# Patient Record
Sex: FEMALE | URBAN - METROPOLITAN AREA
[De-identification: names, ages, dates, MRNs, and addresses within clinical notes are randomized per-mention and may not be internally consistent; named-entity substitution may affect disease eponyms.]

---

## 2022-08-18 ENCOUNTER — APPOINTMENT (OUTPATIENT)
Dept: URBAN - METROPOLITAN AREA CLINIC 247 | Age: 52
Setting detail: DERMATOLOGY
End: 2022-08-18

## 2022-08-18 DIAGNOSIS — L98.8 OTHER SPECIFIED DISORDERS OF THE SKIN AND SUBCUTANEOUS TISSUE: ICD-10-CM

## 2022-08-18 PROCEDURE — OTHER MIPS QUALITY: OTHER

## 2022-08-18 PROCEDURE — OTHER BOTOX: OTHER

## 2022-08-18 NOTE — PROCEDURE: BOTOX
Right Periorbital Units: 0
Show Glabellar Units: Yes
Show Mentalis Units: No
Post-Care Instructions: Patient instructed to not lie down for 4 hours and limit physical activity for 24 hours.
Dilution (U/0.1 Cc): 2.5
Consent: Written consent obtained. Risks include but not limited to lid/brow ptosis, bruising, swelling, diplopia, temporary effect, incomplete chemical denervation.
Comments: Denies breast feeding or pregnancy. Very rare allergic reactions.\\n\\nPatient instructed to exercise muscles injected today for next 1-2 hours.\\nNo face down massage or exercising today. Stay upright for the next 4+hours.
Detail Level: Detailed

## 2022-10-31 ENCOUNTER — APPOINTMENT (OUTPATIENT)
Dept: URBAN - METROPOLITAN AREA CLINIC 247 | Age: 52
Setting detail: DERMATOLOGY
End: 2022-11-02

## 2022-10-31 DIAGNOSIS — Z41.9 ENCOUNTER FOR PROCEDURE FOR PURPOSES OTHER THAN REMEDYING HEALTH STATE, UNSPECIFIED: ICD-10-CM

## 2022-10-31 PROCEDURE — OTHER BOTOX: OTHER

## 2022-10-31 ASSESSMENT — LOCATION ZONE DERM: LOCATION ZONE: LIP

## 2022-10-31 ASSESSMENT — LOCATION DETAILED DESCRIPTION DERM: LOCATION DETAILED: RIGHT UPPER CUTANEOUS LIP

## 2022-10-31 ASSESSMENT — LOCATION SIMPLE DESCRIPTION DERM: LOCATION SIMPLE: RIGHT LIP

## 2022-10-31 NOTE — PROCEDURE: BOTOX
Show Additional Area 6: Yes
Inferior Lateral Orbicularis Oculi Units: 0
Show Ucl Units: No
Dilution (U/0.1 Cc): 2.5
Detail Level: Detailed
Post-Care Instructions: Patient instructed to not lie down for 4 hours and limit physical activity for 24 hours.
Additional Area 1 Location: upper lip margin
Consent: Written consent obtained. Risks include but not limited to lid/brow ptosis, bruising, swelling, diplopia, temporary effect, incomplete chemical denervation.
Additional Area 1 Units: 4

## 2022-12-06 ENCOUNTER — APPOINTMENT (OUTPATIENT)
Dept: URBAN - METROPOLITAN AREA CLINIC 247 | Age: 52
Setting detail: DERMATOLOGY
End: 2022-12-06

## 2022-12-06 DIAGNOSIS — Z41.9 ENCOUNTER FOR PROCEDURE FOR PURPOSES OTHER THAN REMEDYING HEALTH STATE, UNSPECIFIED: ICD-10-CM

## 2022-12-06 PROCEDURE — OTHER BOTOX: OTHER

## 2022-12-06 PROCEDURE — OTHER BOTOX (U OR CC): OTHER

## 2022-12-06 NOTE — PROCEDURE: BOTOX (U OR CC)
Document As Units Or Cc?: units
Additional Area 4 Units: 0
Post-Care Instructions: Patient instructed to not lie down for 4 hours and limit physical activity for 24 hours.
Dilution (U/0.1 Cc): 2.5
Including Pricing Information In The Note: No
Detail Level: Detailed
Consent: Written consent obtained. Risks include but not limited to lid/brow ptosis, bruising, swelling, diplopia, temporary effect, incomplete chemical denervation.

## 2022-12-06 NOTE — PROCEDURE: BOTOX
Comments: Written consent obtained. Risks include but not limited to lid/brow ptosis, bruising, swelling, diplopia, temporary effect, incomplete chemical denervation an asymmetry. Denies breast feeding or pregnancy. Very rare allergic reactions.\\n$12/unit\\nPatient instructed to exercise muscles injected today for next 1-2 hours.\\nNo face down massage or exercising today. Stay upright for the next 4+hours.

## 2023-03-20 ENCOUNTER — APPOINTMENT (OUTPATIENT)
Dept: URBAN - METROPOLITAN AREA CLINIC 247 | Age: 53
Setting detail: DERMATOLOGY
End: 2023-03-23

## 2023-03-20 DIAGNOSIS — Z41.9 ENCOUNTER FOR PROCEDURE FOR PURPOSES OTHER THAN REMEDYING HEALTH STATE, UNSPECIFIED: ICD-10-CM

## 2023-03-20 PROCEDURE — OTHER BOTOX: OTHER

## 2023-05-11 ENCOUNTER — APPOINTMENT (OUTPATIENT)
Dept: URBAN - METROPOLITAN AREA CLINIC 247 | Age: 53
Setting detail: DERMATOLOGY
End: 2023-05-11

## 2023-05-11 DIAGNOSIS — L98.8 OTHER SPECIFIED DISORDERS OF THE SKIN AND SUBCUTANEOUS TISSUE: ICD-10-CM

## 2023-05-11 PROCEDURE — OTHER MIPS QUALITY: OTHER

## 2023-05-11 PROCEDURE — OTHER BOTOX: OTHER

## 2023-05-11 NOTE — PROCEDURE: BOTOX
Show Lateral Platysmal Band Units: Yes
Nasal Root Units: 0
Show Right And Left Periorbital Units: No
Comments: Denies breast feeding or pregnancy. Very rare allergic reactions.\\nPatient instructed to exercise muscles injected today for next 1-2 hours.\\nNo face down massage or exercising today. Stay upright for the next 4+hours.
Forehead Units: 10
Consent: Written consent obtained. Risks include but not limited to lid/brow ptosis, bruising, swelling, diplopia, temporary effect, incomplete chemical denervation.
Glabellar Complex Units: 6
Periorbital Skin Units: 24
Detail Level: Detailed
Post-Care Instructions: Patient instructed to not lie down for 4 hours and limit physical activity for 24 hours.
Dilution (U/0.1 Cc): 2.5

## 2023-06-16 ENCOUNTER — APPOINTMENT (OUTPATIENT)
Dept: URBAN - METROPOLITAN AREA CLINIC 245 | Age: 53
Setting detail: DERMATOLOGY
End: 2023-06-17

## 2023-06-16 DIAGNOSIS — Z41.9 ENCOUNTER FOR PROCEDURE FOR PURPOSES OTHER THAN REMEDYING HEALTH STATE, UNSPECIFIED: ICD-10-CM

## 2023-06-16 PROCEDURE — OTHER BOTOX: OTHER

## 2023-10-24 ENCOUNTER — APPOINTMENT (OUTPATIENT)
Dept: URBAN - METROPOLITAN AREA CLINIC 247 | Age: 53
Setting detail: DERMATOLOGY
End: 2023-10-24

## 2023-10-24 DIAGNOSIS — Z23 ENCOUNTER FOR IMMUNIZATION: ICD-10-CM

## 2023-10-24 PROCEDURE — 90674 CCIIV4 VAC NO PRSV 0.5 ML IM: CPT

## 2023-10-24 PROCEDURE — 90471 IMMUNIZATION ADMIN: CPT

## 2023-10-24 PROCEDURE — OTHER IMMUNIZATION: OTHER

## 2023-10-24 ASSESSMENT — LOCATION DETAILED DESCRIPTION DERM: LOCATION DETAILED: LEFT ANTERIOR PROXIMAL UPPER ARM

## 2023-10-24 ASSESSMENT — LOCATION SIMPLE DESCRIPTION DERM: LOCATION SIMPLE: LEFT UPPER ARM

## 2023-10-24 ASSESSMENT — LOCATION ZONE DERM: LOCATION ZONE: ARM

## 2023-10-24 NOTE — PROCEDURE: IMMUNIZATION
Medication: Influenza vaccination (CPT 39958) - Quadrivalent (ccIIV4), derived from cell cultures, subunit, preservative and antibiotic free, for intramuscular use Medication: Influenza vaccination (CPT 24577) - Quadrivalent (ccIIV4), derived from cell cultures, subunit, preservative and antibiotic free, for intramuscular use

## 2023-10-24 NOTE — PROCEDURE: IMMUNIZATION
Influenza Vaccination (Quadrivalent (Cciiv4), Derived From Cell Cultures, Subunit, Preservative And Antibiotic Free, For Intramuscular Use) Ndc: 10688726041 Influenza Vaccination (Quadrivalent (Cciiv4), Derived From Cell Cultures, Subunit, Preservative And Antibiotic Free, For Intramuscular Use) Ndc: 70429230331

## 2023-10-31 ENCOUNTER — APPOINTMENT (OUTPATIENT)
Dept: URBAN - METROPOLITAN AREA CLINIC 247 | Age: 53
Setting detail: DERMATOLOGY
End: 2023-11-01

## 2023-10-31 DIAGNOSIS — Z41.9 ENCOUNTER FOR PROCEDURE FOR PURPOSES OTHER THAN REMEDYING HEALTH STATE, UNSPECIFIED: ICD-10-CM

## 2023-10-31 PROCEDURE — OTHER BOTOX: OTHER

## 2023-10-31 NOTE — PROCEDURE: BOTOX
Additional Area 5 Units: 0
Show Additional Area 5: Yes
Additional Area 1 Location: upper lip
Show Mentalis Units: No
Glabellar Complex Units: 24
Show Inventory Tab: Show
Consent: Written consent obtained. Risks include but not limited to lid/brow ptosis, bruising, swelling, diplopia, temporary effect, incomplete chemical denervation. \\n\\nDenies breast feeding or pregnancy. Very rare allergic reactions.\\n$13/unit\\nPatient instructed to exercise muscles injected today for next 1-2 hours.\\nNo face down massage or exercising today. Stay upright for the next 4+hours.
Dilution (U/0.1 Cc): 2.5
Post-Care Instructions: Patient instructed to not lie down for 4 hours and limit physical activity for 24 hours.
Depressor Anguli Oris Units: 8
Additional Area 2 Location: eyebrow lift
Incrementing Botox Units: By 0.5 Units
Detail Level: Simple

## 2023-12-04 ENCOUNTER — RX ONLY (RX ONLY)
Age: 53
End: 2023-12-04

## 2023-12-04 RX ORDER — BIMATOPROST 0.3 MG/ML
SOLUTION/ DROPS OPHTHALMIC
Qty: 3 | Refills: 3 | Status: ERX | COMMUNITY
Start: 2023-12-04

## 2024-01-15 ENCOUNTER — APPOINTMENT (OUTPATIENT)
Dept: URBAN - METROPOLITAN AREA CLINIC 245 | Age: 54
Setting detail: DERMATOLOGY
End: 2024-01-16

## 2024-01-15 DIAGNOSIS — L98.8 OTHER SPECIFIED DISORDERS OF THE SKIN AND SUBCUTANEOUS TISSUE: ICD-10-CM

## 2024-01-15 PROCEDURE — OTHER COUNSELING: OTHER

## 2024-01-15 PROCEDURE — OTHER BOTOX: OTHER

## 2024-01-15 NOTE — PROCEDURE: BOTOX
Additional Area 3 Units: 0
Show Additional Area 4: Yes
Consent: Written consent obtained. Risks include but not limited to lid/brow ptosis, bruising, swelling, diplopia, temporary effect, incomplete chemical denervation.
Show Right And Left Pupillary Line Units: No
Incrementing Botox Units: By 0.5 Units
Post-Care Instructions: Patient instructed to not lie down for 4 hours and limit physical activity for 24 hours.
Detail Level: Detailed
Show Inventory Tab: Show
Additional Area 1 Units: 6
Dilution (U/0.1 Cc): 2.5
Additional Area 1 Location: upper lip

## 2024-04-30 ENCOUNTER — APPOINTMENT (OUTPATIENT)
Dept: URBAN - METROPOLITAN AREA CLINIC 247 | Age: 54
Setting detail: DERMATOLOGY
End: 2024-04-30

## 2024-04-30 DIAGNOSIS — Z41.9 ENCOUNTER FOR PROCEDURE FOR PURPOSES OTHER THAN REMEDYING HEALTH STATE, UNSPECIFIED: ICD-10-CM

## 2024-04-30 PROCEDURE — OTHER BOTOX: OTHER

## 2024-04-30 NOTE — PROCEDURE: BOTOX
Additional Area 5 Units: 0
Show Additional Area 5: Yes
Forehead Units: 12
Additional Area 1 Location: upper lip
Show Mentalis Units: No
Glabellar Complex Units: 14
Show Inventory Tab: Show
Consent: Written consent obtained. Risks include but not limited to lid/brow ptosis, bruising, swelling, diplopia, temporary effect, incomplete chemical denervation. \\n\\nDenies breast feeding or pregnancy. Very rare allergic reactions.\\n$13/unit\\nPatient instructed to exercise muscles injected today for next 1-2 hours.\\nNo face down massage or exercising today. Stay upright for the next 4+hours.
Additional Area 1 Units: 8
Dilution (U/0.1 Cc): 2.5
Post-Care Instructions: Patient instructed to not lie down for 4 hours and limit physical activity for 24 hours.
Periorbital Skin Units: 20
Additional Area 2 Location: eyebrow lift
Incrementing Botox Units: By 0.5 Units
Detail Level: Simple

## 2024-06-27 ENCOUNTER — RX ONLY (RX ONLY)
Age: 54
End: 2024-06-27

## 2024-06-27 RX ORDER — BIMATOPROST 0.3 MG/ML
SOLUTION/ DROPS OPHTHALMIC
Qty: 3 | Refills: 3 | Status: ERX

## 2024-07-08 ENCOUNTER — APPOINTMENT (OUTPATIENT)
Dept: URBAN - METROPOLITAN AREA CLINIC 245 | Age: 54
Setting detail: DERMATOLOGY
End: 2024-07-09

## 2024-07-08 DIAGNOSIS — L98.8 OTHER SPECIFIED DISORDERS OF THE SKIN AND SUBCUTANEOUS TISSUE: ICD-10-CM

## 2024-07-08 PROCEDURE — OTHER BOTOX: OTHER

## 2024-07-08 ASSESSMENT — LOCATION DETAILED DESCRIPTION DERM
LOCATION DETAILED: LEFT UPPER CUTANEOUS LIP
LOCATION DETAILED: RIGHT UPPER CUTANEOUS LIP

## 2024-07-08 ASSESSMENT — LOCATION SIMPLE DESCRIPTION DERM
LOCATION SIMPLE: RIGHT LIP
LOCATION SIMPLE: LEFT LIP

## 2024-07-08 ASSESSMENT — LOCATION ZONE DERM: LOCATION ZONE: LIP

## 2024-07-08 NOTE — PROCEDURE: BOTOX
Detail Level: Detailed
Show Inventory Tab: Show
Incrementing Botox Units: By 0.5 Units
Levator Labii Superioris Units: 0
Additional Area 1 Location: upper lip
Additional Area 1 Units: 6
Dilution (U/0.1 Cc): 4
Consent: Written consent obtained. Risks include but not limited to lid/brow ptosis, bruising, swelling, diplopia, temporary effect, incomplete chemical denervation.
Post-Care Instructions: Patient instructed to not lie down for 4 hours and limit physical activity for 24 hours.
Show Topical Anesthesia: Yes
Show Right And Left Brow Units: No

## 2024-08-10 ENCOUNTER — OFFICE VISIT (OUTPATIENT)
Dept: FAMILY MEDICINE CLINIC | Facility: CLINIC | Age: 54
End: 2024-08-10
Payer: COMMERCIAL

## 2024-08-10 VITALS
DIASTOLIC BLOOD PRESSURE: 62 MMHG | HEART RATE: 77 BPM | TEMPERATURE: 97 F | BODY MASS INDEX: 21.35 KG/M2 | SYSTOLIC BLOOD PRESSURE: 90 MMHG | OXYGEN SATURATION: 97 % | HEIGHT: 62 IN | WEIGHT: 116 LBS | RESPIRATION RATE: 12 BRPM

## 2024-08-10 DIAGNOSIS — Z00.00 WELL ADULT EXAM: Primary | ICD-10-CM

## 2024-08-10 DIAGNOSIS — Z23 NEED FOR SHINGLES VACCINE: ICD-10-CM

## 2024-08-10 DIAGNOSIS — N91.1 SECONDARY AMENORRHEA: ICD-10-CM

## 2024-08-10 DIAGNOSIS — Z12.31 SCREENING MAMMOGRAM FOR BREAST CANCER: ICD-10-CM

## 2024-08-10 DIAGNOSIS — S40.862A INSECT BITE OF LEFT UPPER ARM, INITIAL ENCOUNTER: ICD-10-CM

## 2024-08-10 DIAGNOSIS — M25.542 ARTHRALGIA OF LEFT HAND: ICD-10-CM
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
## 2024-08-10 PROBLEM — R51.9 NONINTRACTABLE EPISODIC HEADACHE: Status: RESOLVED | Noted: 2018-11-05 | Resolved: 2024-08-10

## 2024-08-10 PROBLEM — N64.89 FULLNESS OF BREAST: Status: ACTIVE | Noted: 2019-09-06

## 2024-08-10 PROBLEM — H93.13 TINNITUS, BILATERAL: Status: RESOLVED | Noted: 2022-04-08 | Resolved: 2024-08-10

## 2024-08-10 PROBLEM — N84.1 CERVICAL POLYP: Status: ACTIVE | Noted: 2018-11-05

## 2024-08-10 PROBLEM — R94.5 ABNORMAL LIVER FUNCTION: Status: ACTIVE | Noted: 2019-01-29

## 2024-08-10 PROBLEM — R51.9 HEADACHE, CHRONIC DAILY: Status: ACTIVE | Noted: 2019-09-06

## 2024-08-10 PROBLEM — R79.89 ABNORMAL TSH: Status: ACTIVE | Noted: 2017-04-22

## 2024-08-10 PROBLEM — H04.129 DRY EYES DUE TO DECREASED TEAR PRODUCTION: Status: ACTIVE | Noted: 2019-09-06

## 2024-08-10 PROBLEM — E78.5 DYSLIPIDEMIA: Status: ACTIVE | Noted: 2017-04-22

## 2024-08-10 PROBLEM — J34.89 NASAL SEPTAL SPUR: Status: ACTIVE | Noted: 2022-04-08

## 2024-08-10 PROBLEM — R42 DIZZINESS: Status: ACTIVE | Noted: 2019-09-06

## 2024-08-10 PROBLEM — G43.009 ATYPICAL MIGRAINE: Status: ACTIVE | Noted: 2022-04-08

## 2024-08-10 PROCEDURE — 99213 OFFICE O/P EST LOW 20 MIN: CPT | Performed by: FAMILY MEDICINE

## 2024-08-10 PROCEDURE — 99386 PREV VISIT NEW AGE 40-64: CPT | Performed by: FAMILY MEDICINE

## 2024-08-10 RX ORDER — SULFAMETHOXAZOLE AND TRIMETHOPRIM 800; 160 MG/1; MG/1
1 TABLET ORAL 2 TIMES DAILY
Qty: 14 TABLET | Refills: 0 | Status: SHIPPED | OUTPATIENT
Start: 2024-08-10

## 2024-08-10 NOTE — PROGRESS NOTES
Chief Complaint   Patient presents with    Physical     Mamm,          HPI  Patient is here to establish care and receive a wellness visit.  She has several complaints aside from this including left thumb pain that she has noticed more with carrying her laptop at work.  And she has a erythematous spot on her left arm that she thinks is an insect bite but it is becoming increasingly more painful.  This occurred a few days ago.  Patient's Pap smear is up-to-date and she had an ablation done after which she has not had a menstrual cycle.  She is agreeable to getting an FSH checked also    Colonoscopy is UTD will get copy    ROS  As per HPI and all other systems reviewed and are negative      Past Medical History:    Nonintractable episodic headache    Spinal stenosis of lumbar region       History reviewed. No pertinent surgical history.    Social History     Socioeconomic History    Marital status:    Tobacco Use    Smoking status: Never    Smokeless tobacco: Never       History reviewed. No pertinent family history.     Current Outpatient Medications on File Prior to Visit   Medication Sig Dispense Refill    diclofenac 1 % External Gel Apply 1 Application topically 3 (three) times daily. (Patient not taking: Reported on 8/10/2024)      escitalopram 5 MG Oral Tab Take 0.5 tablets (2.5 mg total) by mouth daily. (Patient not taking: Reported on 8/10/2024)      Bimatoprost 0.03 % External Solution APPLY TO EYELASH BASE WITH APPLICATOR NIGHTLY (Patient not taking: Reported on 8/10/2024)       No current facility-administered medications on file prior to visit.         Objective  Vitals:    08/10/24 0954   BP: 90/62   Pulse: 77   Resp: 12   Temp: 97.4 °F (36.3 °C)   TempSrc: Temporal   SpO2: 97%   Weight: 116 lb (52.6 kg)   Height: 5' 2\" (1.575 m)     Physical Exam  Constitutional:       Appearance: Normal appearance.   HEENT:      Head: Normocephalic and atraumatic.      Eyes: PERRLA no notable nystagmus     Ears:  Normal on observation     Nose: Nose normal.      Mouth: Mucous membranes are moist.      Neck: No masses no bruit  Cardiovascular:      Rate and Rhythm: Normal rate and regular rhythm.   Breasts:       No nipple abnormality or discharge; no axillary or supraclavicular lymph nodes palpable; no skin changes chaperoned by Anisa  Pulmonary:      Effort: Pulmonary effort is normal.      Breath sounds: Normal breath sounds.   Abdominal:      General: Bowel sounds are normal.      Palpations: Abdomen is soft. There is no mass.   Musculoskeletal:         General: Normal range of motion.      Cervical back: Normal range of motion. Tender over palmar aspect of left thumb base. Joint swelling noted at distal IPPs  Skin:     General: Skin is warm and dry. Quarter sized raised tender erythematous lesion with streaking   Neurological:      General: No focal deficit present.      Mental Status: She is alert and oriented to person, place, and time.   Psychiatric:         Mood and Affect: Mood normal.         Thought Content: Thought content normal.       Assessment and Plan  Milla was seen today for physical.    Diagnoses and all orders for this visit:    Well adult exam  -     CBC With Differential With Platelet  -     Comp Metabolic Panel (14)  -     Lipid Panel  -     FSH [E]  -     TSH [E]    Need for shingles vaccine  -     Cancel: Zoster Recombinant Adjuvanted (Shingrix -Shingles) [97235]    Screening mammogram for breast cancer  -     [facility] JADE 2D+3D SCREENING BILAT (CPT=77067/43740); Future    Insect bite of left upper arm, initial encounter  -     sulfamethoxazole-trimethoprim DS (BACTRIM DS) 800-160 MG Oral Tab per tablet; Take 1 tablet by mouth 2 (two) times daily.    Secondary amenorrhea  -     FSH [E]    Arthralgia of left hand  -     Cyclic Citrullinate Pep. IGG [E]  -     Rheumatoid Arthritis Factor [E]  -     LARRY COMPREHENSIVE PANEL [249] [Q]           Follow up  No follow-ups on file.      Patient  Instructions  There are no Patient Instructions on file for this visit.       Irais Dale MD

## 2024-08-23 ENCOUNTER — APPOINTMENT (OUTPATIENT)
Dept: URBAN - METROPOLITAN AREA CLINIC 247 | Age: 54
Setting detail: DERMATOLOGY
End: 2024-08-26

## 2024-08-23 DIAGNOSIS — Z41.9 ENCOUNTER FOR PROCEDURE FOR PURPOSES OTHER THAN REMEDYING HEALTH STATE, UNSPECIFIED: ICD-10-CM

## 2024-08-23 PROCEDURE — OTHER SIGNATURE HYDRAFACIAL: OTHER

## 2024-08-23 ASSESSMENT — LOCATION ZONE DERM: LOCATION ZONE: FACE

## 2024-08-23 ASSESSMENT — LOCATION DETAILED DESCRIPTION DERM: LOCATION DETAILED: RIGHT INFERIOR MEDIAL FOREHEAD

## 2024-08-23 ASSESSMENT — LOCATION SIMPLE DESCRIPTION DERM: LOCATION SIMPLE: RIGHT FOREHEAD

## 2024-08-26 ENCOUNTER — TELEPHONE (OUTPATIENT)
Dept: FAMILY MEDICINE CLINIC | Facility: CLINIC | Age: 54
End: 2024-08-26

## 2024-08-26 DIAGNOSIS — E05.90 OVERACTIVE THYROID GLAND: Primary | ICD-10-CM

## 2024-08-26 NOTE — TELEPHONE ENCOUNTER
Written by Irais Dale MD on 8/26/2024 11:42 AM CDT  Seen by patient Milla Luz on 8/26/2024 12:11 PM    Left detailed message to voicemail (per verbal release form consent with confirmed identifying message) of note below. Patient was advised to call office back if ok to place XR bone scan order, and/or with questions/concerns

## 2024-08-26 NOTE — TELEPHONE ENCOUNTER
----- Message from Irais Dale sent at 8/26/2024 11:42 AM CDT -----  Pt appears to have overactive thyroid. Please add thyroid antibodies to labs drawn. She is in menopause and needs a bone density scan but is negative for RA. Thank you  Written by Irais Dale MD on 8/26/2024 11:42 AM CDT  Seen by patient Milla Luz on 8/26/2024 12:11 PM

## 2024-08-26 NOTE — TELEPHONE ENCOUNTER
Called [facility] - confirmed lab order added to existing specimen from 08/23/24  Thyroid Peroxidase and Thyroglobulin Antibodies: Test code 7260  Diagnosis: Overactive thyroid gland [E05.90]

## 2024-08-27 ENCOUNTER — TELEPHONE (OUTPATIENT)
Dept: FAMILY MEDICINE CLINIC | Facility: CLINIC | Age: 54
End: 2024-08-27

## 2024-08-27 LAB
ABSOLUTE BASOPHILS: 31 CELLS/UL (ref 0–200)
ABSOLUTE EOSINOPHILS: 110 CELLS/UL (ref 15–500)
ABSOLUTE LYMPHOCYTES: 1738 CELLS/UL (ref 850–3900)
ABSOLUTE MONOCYTES: 290 CELLS/UL (ref 200–950)
ABSOLUTE NEUTROPHILS: 2231 CELLS/UL (ref 1500–7800)
ALBUMIN/GLOBULIN RATIO: 2.4 (CALC) (ref 1–2.5)
ALBUMIN: 4.8 G/DL (ref 3.6–5.1)
ALKALINE PHOSPHATASE: 54 U/L (ref 37–153)
ALT: 13 U/L (ref 6–29)
ANA SCREEN, IFA: NEGATIVE
AST: 15 U/L (ref 10–35)
BASOPHILS: 0.7 %
BILIRUBIN, TOTAL: 0.5 MG/DL (ref 0.2–1.2)
BUN: 10 MG/DL (ref 7–25)
CALCIUM: 9.6 MG/DL (ref 8.6–10.4)
CARBON DIOXIDE: 29 MMOL/L (ref 20–32)
CHLORIDE: 104 MMOL/L (ref 98–110)
CHOL/HDLC RATIO: 2.4 (CALC)
CHOLESTEROL, TOTAL: 187 MG/DL
CREATININE: 0.67 MG/DL (ref 0.5–1.03)
CYCLIC CITRULLINATED$PEPTIDE (CCP) AB (IGG): <16 UNITS
EGFR: 103 ML/MIN/1.73M2
EOSINOPHILS: 2.5 %
FSH: 78.8 MIU/ML
GLOBULIN: 2 G/DL (CALC) (ref 1.9–3.7)
GLUCOSE: 88 MG/DL (ref 65–99)
HDL CHOLESTEROL: 79 MG/DL
HEMATOCRIT: 36.8 % (ref 35–45)
HEMOGLOBIN: 11.8 G/DL (ref 11.7–15.5)
LDL-CHOLESTEROL: 89 MG/DL (CALC)
LYMPHOCYTES: 39.5 %
MCH: 29.2 PG (ref 27–33)
MCHC: 32.1 G/DL (ref 32–36)
MCV: 91.1 FL (ref 80–100)
MONOCYTES: 6.6 %
MPV: 10.4 FL (ref 7.5–12.5)
NEUTROPHILS: 50.7 %
NON-HDL CHOLESTEROL: 108 MG/DL (CALC)
PLATELET COUNT: 285 THOUSAND/UL (ref 140–400)
POTASSIUM: 4 MMOL/L (ref 3.5–5.3)
PROTEIN, TOTAL: 6.8 G/DL (ref 6.1–8.1)
RDW: 12.7 % (ref 11–15)
RED BLOOD CELL COUNT: 4.04 MILLION/UL (ref 3.8–5.1)
RHEUMATOID FACTOR: <10 IU/ML
SODIUM: 142 MMOL/L (ref 135–146)
THYROGLOBULIN ANTIBODIES: <1 IU/ML
THYROID PEROXIDASE$ANTIBODIES: 1 IU/ML
TRIGLYCERIDES: 96 MG/DL
TSH: 0.38 MIU/L
WHITE BLOOD CELL COUNT: 4.4 THOUSAND/UL (ref 3.8–10.8)

## 2024-08-27 NOTE — TELEPHONE ENCOUNTER
Per Dr Powell-  Have her stop and repeat Tsh and free t4 in 2 weeks       Left voice mail with above note

## 2024-08-27 NOTE — TELEPHONE ENCOUNTER
----- Message from Irais Dale sent at 8/27/2024 10:34 AM CDT -----  Antibody negative. Please check if pt taking biotin. If not then she needs to see endocrinology

## 2024-08-31 NOTE — TELEPHONE ENCOUNTER
Pt advised she would like orders sent to [facility].  She will be going to [facility] in [place] in 2 weeks.  Thank you!

## 2024-09-09 ENCOUNTER — TELEPHONE (OUTPATIENT)
Dept: FAMILY MEDICINE CLINIC | Facility: CLINIC | Age: 54
End: 2024-09-09

## 2024-09-09 DIAGNOSIS — Z12.31 SCREENING MAMMOGRAM FOR BREAST CANCER: Primary | ICD-10-CM

## 2024-09-09 NOTE — TELEPHONE ENCOUNTER
Patient calling to schedule mammogram, please alter order as normal instead of external so she can schedule at [place] location. Endorsed to RN.

## 2024-09-13 ENCOUNTER — HOSPITAL ENCOUNTER (OUTPATIENT)
Dept: MAMMOGRAPHY | Age: 54
Discharge: HOME | End: 2024-09-13
Attending: FAMILY MEDICINE
Payer: COMMERCIAL

## 2024-09-13 DIAGNOSIS — Z12.31 SCREENING MAMMOGRAM FOR BREAST CANCER: ICD-10-CM

## 2024-09-13 PROCEDURE — 77063 BREAST TOMOSYNTHESIS BI: CPT | Performed by: FAMILY MEDICINE

## 2024-09-13 PROCEDURE — 77067 SCR MAMMO BI INCL CAD: CPT | Performed by: FAMILY MEDICINE

## 2024-09-14 LAB
T4, FREE: 1 NG/DL (ref 0.8–1.8)
TSH: 0.2 MIU/L

## 2024-09-17 ENCOUNTER — TELEPHONE (OUTPATIENT)
Dept: FAMILY MEDICINE CLINIC | Facility: CLINIC | Age: 54
End: 2024-09-17

## 2024-09-17 DIAGNOSIS — R79.89 ABNORMAL TSH: Primary | ICD-10-CM

## 2024-09-17 DIAGNOSIS — E05.90 OVERACTIVE THYROID GLAND: ICD-10-CM

## 2024-09-17 NOTE — TELEPHONE ENCOUNTER
----- Message from Christina GRANADOS sent at 9/17/2024  8:27 AM CDT -----  Pt calling back to discuss lab results and referral to endocrinology. Would also like to discuss results for mammogram done on 9/13/24.

## 2024-09-17 NOTE — TELEPHONE ENCOUNTER
Can we see why her mammogram has not been resulted? Her labs are all good except her tsh is low indicating she is hyperthyroid. This is why I am referring her to endo. Referral has been placed

## 2024-09-17 NOTE — TELEPHONE ENCOUNTER
Spoke with patient, would like referral to endo. Also patient is looking for mammogram results. A vaccine was recommended at last visit and patient wanted to know what that was. Also a Dexa scan was discussed, can you please order.     Thank you

## 2024-10-04 ENCOUNTER — HOSPITAL ENCOUNTER (OUTPATIENT)
Dept: MAMMOGRAPHY | Age: 54
Discharge: HOME | End: 2024-10-04
Attending: FAMILY MEDICINE
Payer: COMMERCIAL

## 2024-10-04 DIAGNOSIS — R92.2 INCONCLUSIVE MAMMOGRAM: ICD-10-CM

## 2024-10-04 PROCEDURE — 77065 DX MAMMO INCL CAD UNI: CPT | Performed by: FAMILY MEDICINE

## 2024-10-04 PROCEDURE — 77061 BREAST TOMOSYNTHESIS UNI: CPT | Performed by: FAMILY MEDICINE

## 2024-10-29 ENCOUNTER — APPOINTMENT (OUTPATIENT)
Dept: URBAN - METROPOLITAN AREA CLINIC 248 | Age: 54
Setting detail: DERMATOLOGY
End: 2024-10-30

## 2024-10-29 DIAGNOSIS — L98.8 OTHER SPECIFIED DISORDERS OF THE SKIN AND SUBCUTANEOUS TISSUE: ICD-10-CM

## 2024-10-29 PROCEDURE — OTHER BOTOX: OTHER

## 2024-10-29 NOTE — PROCEDURE: BOTOX
Additional Area 2 Units: 4
Mentalis Units: 0
Show Lcl Units: No
Show Additional Area 5: Yes
Periorbital Skin Units: 20
Additional Area 2 Location: eyebrow lift
Consent: Written consent obtained. Risks include but not limited to lid/brow ptosis, bruising, swelling, diplopia, temporary effect, incomplete chemical denervation. \\n\\nDenies breast feeding or pregnancy. Very rare allergic reactions.\\n$13/unit\\nPatient instructed to exercise muscles injected today for next 1-2 hours.\\nNo face down massage or exercising today. Stay upright for the next 4+hours.
Post-Care Instructions: Patient instructed to not lie down for 4 hours and limit physical activity for 24 hours.
Show Inventory Tab: Show
Glabellar Complex Units: 12
Dilution (U/0.1 Cc): 2.5
Detail Level: Simple
Additional Area 1 Location: upper lip
Incrementing Botox Units: By 0.5 Units

## 2024-12-16 ENCOUNTER — RX ONLY (RX ONLY)
Age: 54
End: 2024-12-16

## 2024-12-16 RX ORDER — HYDROQUINONE 40 MG/G
CREAM TOPICAL
Qty: 28.35 | Refills: 3 | Status: ERX | COMMUNITY
Start: 2024-12-16

## 2024-12-18 ENCOUNTER — RX ONLY (RX ONLY)
Age: 54
End: 2024-12-18

## 2024-12-18 RX ORDER — METHYLPREDNISOLONE 4 MG/1
TABLET ORAL
Qty: 1 | Refills: 0 | Status: ERX | COMMUNITY
Start: 2024-12-18

## 2024-12-18 RX ORDER — AZITHROMYCIN DIHYDRATE 250 MG/1
TABLET, FILM COATED ORAL
Qty: 1 | Refills: 0 | Status: ERX | COMMUNITY
Start: 2024-12-18

## 2024-12-27 ENCOUNTER — OFFICE VISIT (OUTPATIENT)
Facility: CLINIC | Age: 54
End: 2024-12-27
Payer: COMMERCIAL

## 2024-12-27 VITALS
SYSTOLIC BLOOD PRESSURE: 118 MMHG | RESPIRATION RATE: 18 BRPM | BODY MASS INDEX: 23.19 KG/M2 | DIASTOLIC BLOOD PRESSURE: 60 MMHG | WEIGHT: 126 LBS | OXYGEN SATURATION: 99 % | HEIGHT: 62 IN

## 2024-12-27 DIAGNOSIS — R79.89 ABNORMAL TSH: Primary | ICD-10-CM

## 2024-12-27 PROCEDURE — 99214 OFFICE O/P EST MOD 30 MIN: CPT | Performed by: STUDENT IN AN ORGANIZED HEALTH CARE EDUCATION/TRAINING PROGRAM

## 2024-12-27 NOTE — PATIENT INSTRUCTIONS
Please do your lab work at your convenience and we can decide if we need any further imaging and/or if there's any reason for us to consider medication.    General follow up information:  Please let us know if you require any refills at least 1 week prior to your medication running out. If you do run out of medication, please call our office ASAP to request refills (do not wait until your follow up).  Please call us if you experience any problems with insurance coverage of medication, lab work, or imaging.   Lab results and imaging will typically be reviewed at follow up appointments, or within 3-5 business days of ALL results being in if you do not have an appointment scheduled in the near future. Our office will contact you for any abnormal results requiring more urgent follow up or action.   The on-call pager is for urgent matters only. If you are a type 1 diabetic and run out of insulin after business hours 8AM-4PM, you may call the on-call pager for a refill to a 24 hour pharmacy. If you have adrenal insufficiency and run out of steroids, you may call the on-call pager for a refill to a 24 hour pharmacy. All other refill requests should be requested during business hours.    Return Visit   [] Dr. Menchaca in 4-6 month

## 2025-01-03 LAB
T3, TOTAL: 88 NG/DL (ref 76–181)
T4, FREE: 1 NG/DL (ref 0.8–1.8)
TSH: 0.4 MIU/L
TSI: <89 % BASELINE

## 2025-01-05 NOTE — H&P
Endocrinology Clinic Note    Name: Milla Luz    Date: 1/5/2025       HISTORY OF PRESENT ILLNESS   Milla Luz is a 54 year old female with PMHx significant for  who presents for initial endocrine consultation for       PAST MEDICAL HISTORY:   Past Medical History:    Nonintractable episodic headache    Spinal stenosis of lumbar region       PAST SURGICAL HISTORY:   History reviewed. No pertinent surgical history.    CURRENT MEDICATIONS:    No current outpatient medications on file.         ALLERGIES:  Allergies[1]    SOCIAL HISTORY:    Social History     Socioeconomic History    Marital status:    Tobacco Use    Smoking status: Never    Smokeless tobacco: Never       FAMILY HISTORY:   Family History   Problem Relation Age of Onset    Breast Cancer Maternal Grandmother 60        approx.         REVIEW OF SYSTEMS:  Ten point review of systems has been performed and is otherwise negative and/or non-contributory, except as described above.      PHYSICAL EXAM:   Vitals:    12/27/24 1355   BP: 118/60   Resp: 18   SpO2: 99%   Weight: 126 lb (57.2 kg)   Height: 5' 2\" (1.575 m)     BMI: Body mass index is 23.05 kg/m².     CONSTITUTIONAL:  awake, alert, cooperative, no apparent distress, and appears stated age  PSYCH: normal affect  EYES:  No proptosis,  conjunctiva normal  ENT:  Normocephalic, atraumatic  NECK:  Supple, symmetrical, thyroid not enlarged and no tenderness  LUNGS: breathing comfortably  CARDIOVASCULAR:  regular rate   ABDOMEN:  soft  SKIN:  no rashes and no lesions  EXTREMITIES: no edema      DATA:     Pertinent data reviewed  No results found.  No results found.      ASSESSMENT AND PLAN:    1. Abnormal TSH    - TSH and Free T4 [E]  - Triiodothyronine (T3) Total [E]  - Thyroid Stimulating Immunoglobulin  - Thyrotropin Receptor Ab, Serum; Future            Orders Placed This Encounter   Procedures    TSH and Free T4 [E]    Triiodothyronine (T3) Total [E]    Thyroid Stimulating  Immunoglobulin    Thyrotropin Receptor Ab, Serum       The above plan was discussed in detail with the patient who verbalized understanding and agreement.      A total of  minutes was spent today on obtaining history, reviewing outside records, reviewing pertinent labs/imaging, reviewing relevant pathophysiology with patient, evaluating patient, providing multiple treatment options, communicating with patient's other providers as appropriate, and completing documentation and orders.      Nadege Menchaca DO  [facility]  12/27/24       Note to patient: The 21 Century Cures Act makes medical notes like these available to patients in the interest of transparency. However, be advised this is a medical document. It is intended as peer to peer communication. It is written in medical language and may contain abbreviations or verbiage that are unfamiliar. It may appear blunt or direct. Medical documents are intended to carry relevant information, facts as evident, and the clinical opinion of the practitioner.      In reviewing this note, please be advised that Dragon Voice Recognition software used to dictate the note may have made errors in recognizing some of the words or phrases.                    [1] No Known Allergies

## 2025-01-05 NOTE — PROGRESS NOTES
Please let the patient know her thyroid levels, including antibody for Graves' are currently normal. Her TSH remains on the low side so I would like the following pituitary labs at 8AM fasting, at her convenience, to make sure there is no concern for decreased TSH signaling from the brain:    -T4 by dialysis  -Prolactin  -ACTH  -Cortisol  -FSH  -LH  -Estradiol     She should be off Nutrafol or other biotin compounds for at least 3-5 days before lab draw. Thanks!

## 2025-01-07 ENCOUNTER — PATIENT MESSAGE (OUTPATIENT)
Facility: CLINIC | Age: 55
End: 2025-01-07

## 2025-01-07 DIAGNOSIS — R79.89 ABNORMAL TSH: Primary | ICD-10-CM

## 2025-01-08 NOTE — TELEPHONE ENCOUNTER
Reviewed [facility] portal and lab orders are in the system. See below:    However, lab orders were printed out and faxed to the [email or web address] location per patient request. MY CHART MESSAGE sent to patient inform of the above.   Closing encounter.

## 2025-01-18 LAB
ACTH, PLASMA: 7 PG/ML (ref 6–50)
CORTISOL, TOTAL: 5.5 MCG/DL
ESTRADIOL: <15 PG/ML
FSH: 76.3 MIU/ML
LH: 37.6 MIU/ML
PROLACTIN: 5.4 NG/ML

## 2025-01-23 ENCOUNTER — PATIENT MESSAGE (OUTPATIENT)
Facility: CLINIC | Age: 55
End: 2025-01-23

## 2025-01-23 DIAGNOSIS — R79.89 LOW SERUM CORTISOL LEVEL: Primary | ICD-10-CM

## 2025-01-23 NOTE — TELEPHONE ENCOUNTER
Please let patient know I am still waiting on the final result (T4 by dialysis), we typically comment when we have everything back together. Her prolactin and estrogen levels were normal. Cortisol and ACTH were not insufficient but were on the low side; I would have her repeat these fasting at 8AM at her convenience and if still on the low side would follow with an ACTH stim test. When thyroid result is in I will contact her. I have placed cortisol/ACTH.

## 2025-02-08 ENCOUNTER — PATIENT MESSAGE (OUTPATIENT)
Facility: CLINIC | Age: 55
End: 2025-02-08

## 2025-02-10 NOTE — TELEPHONE ENCOUNTER
Hello! I see her remaining labs but the T4 by dialysis is still listed as future. I'm afraid we may have to check if they ran it or lost it and if it's not processing would have to ask her to do it again. (She needs to repeat 8AM cortisol anyway).

## 2025-02-11 NOTE — TELEPHONE ENCOUNTER
Checked [facility] portal and labs that were collected on 1/15/25 have finalized. Printed labs out and placed on providers desk for review.  Entered results below as well and routing message to provider.

## 2025-02-12 NOTE — TELEPHONE ENCOUNTER
If they didn't run it, recommend she just have it redrawn when she goes for her repeat 8AM cortisol/ACTH. They have run that lab before and I know there's no [facility]-specific version, so not sure if she would need to take a paper order for them to run it?

## 2025-02-14 NOTE — TELEPHONE ENCOUNTER
Received call from patient to review below    Patient to repeat 8 am fasting labs- reviewed the three that are ordered.    Faxed orders to [facility]- patient will make an 8 am appointment.    Follow up Mychart sent for review.     Lab orders faxed to [facility] Fax   [phone]    Awaiting confirmation

## 2025-02-21 ENCOUNTER — APPOINTMENT (OUTPATIENT)
Dept: URBAN - METROPOLITAN AREA CLINIC 245 | Age: 55
Setting detail: DERMATOLOGY
End: 2025-02-22

## 2025-02-21 DIAGNOSIS — L98.8 OTHER SPECIFIED DISORDERS OF THE SKIN AND SUBCUTANEOUS TISSUE: ICD-10-CM

## 2025-02-21 PROCEDURE — OTHER BOTOX: OTHER

## 2025-02-21 NOTE — PROCEDURE: BOTOX
Additional Area 3 Units: 0
Show Nasal Units: Yes
Incrementing Botox Units: By 0.5 Units
Consent: Written consent obtained. Risks include but not limited to lid/brow ptosis, bruising, swelling, diplopia, temporary effect, incomplete chemical denervation.
Show Ucl Units: No
Detail Level: Detailed
Post-Care Instructions: Patient instructed to not lie down for 4 hours and limit physical activity for 24 hours.
Additional Area 1 Location: upper lip
Show Inventory Tab: Show
Dilution (U/0.1 Cc): 2.5
Additional Area 1 Units: 6

## 2025-02-24 LAB
ACTH, PLASMA: 8 PG/ML (ref 6–50)
CORTISOL, TOTAL: 10.4 MCG/DL
Lab: 1.7 NG/DL (ref 0.9–2.2)

## 2025-02-25 ENCOUNTER — PATIENT MESSAGE (OUTPATIENT)
Facility: CLINIC | Age: 55
End: 2025-02-25

## 2025-05-13 ENCOUNTER — PATIENT MESSAGE (OUTPATIENT)
Facility: CLINIC | Age: 55
End: 2025-05-13

## 2025-05-19 NOTE — TELEPHONE ENCOUNTER
[facility]ax[facility] orders to [facility] in [place]. [phone] Sent patient my chart message.     Closing encounter.

## 2025-06-16 ENCOUNTER — RX ONLY (RX ONLY)
Age: 55
End: 2025-06-16

## 2025-06-16 RX ORDER — AZITHROMYCIN DIHYDRATE 250 MG/1
TABLET, FILM COATED ORAL
Qty: 1 | Refills: 0 | Status: ERX

## 2025-06-16 RX ORDER — METHYLPREDNISOLONE 4 MG/1
TABLET ORAL
Qty: 1 | Refills: 0 | Status: ERX

## 2025-06-17 ENCOUNTER — APPOINTMENT (OUTPATIENT)
Dept: URBAN - METROPOLITAN AREA CLINIC 247 | Age: 55
Setting detail: DERMATOLOGY
End: 2025-06-17

## 2025-06-17 DIAGNOSIS — L98.8 OTHER SPECIFIED DISORDERS OF THE SKIN AND SUBCUTANEOUS TISSUE: ICD-10-CM

## 2025-06-17 PROCEDURE — OTHER BOTOX: OTHER

## 2025-07-07 ENCOUNTER — APPOINTMENT (OUTPATIENT)
Dept: URBAN - METROPOLITAN AREA CLINIC 247 | Age: 55
Setting detail: DERMATOLOGY
End: 2025-07-07

## 2025-07-07 DIAGNOSIS — L98.8 OTHER SPECIFIED DISORDERS OF THE SKIN AND SUBCUTANEOUS TISSUE: ICD-10-CM

## 2025-07-07 PROCEDURE — OTHER BOTOX: OTHER

## 2025-07-07 PROCEDURE — OTHER INVENTORY: OTHER

## 2025-08-25 ENCOUNTER — RX ONLY (RX ONLY)
Age: 55
End: 2025-08-25

## 2025-08-25 RX ORDER — BIMATOPROST 0.3 MG/ML
SOLUTION/ DROPS OPHTHALMIC
Qty: 5 | Refills: 1 | Status: ERX